# Patient Record
Sex: FEMALE | Race: WHITE
[De-identification: names, ages, dates, MRNs, and addresses within clinical notes are randomized per-mention and may not be internally consistent; named-entity substitution may affect disease eponyms.]

---

## 2017-05-10 ENCOUNTER — HOSPITAL ENCOUNTER (OUTPATIENT)
Dept: HOSPITAL 80 - FIMAGING | Age: 47
End: 2017-05-10
Attending: GENERAL ACUTE CARE HOSPITAL
Payer: COMMERCIAL

## 2017-05-10 DIAGNOSIS — Z31.9: Primary | ICD-10-CM

## 2017-05-19 ENCOUNTER — HOSPITAL ENCOUNTER (OUTPATIENT)
Dept: HOSPITAL 80 - FIMAGING | Age: 47
End: 2017-05-19
Attending: INTERNAL MEDICINE
Payer: COMMERCIAL

## 2017-05-19 DIAGNOSIS — R92.8: Primary | ICD-10-CM

## 2017-09-18 ENCOUNTER — HOSPITAL ENCOUNTER (EMERGENCY)
Dept: HOSPITAL 80 - CED | Age: 47
Discharge: HOME | End: 2017-09-18
Payer: COMMERCIAL

## 2017-09-18 VITALS — HEART RATE: 90 BPM | DIASTOLIC BLOOD PRESSURE: 74 MMHG | SYSTOLIC BLOOD PRESSURE: 117 MMHG

## 2017-09-18 VITALS — OXYGEN SATURATION: 97 % | RESPIRATION RATE: 16 BRPM | TEMPERATURE: 98.1 F

## 2017-09-18 DIAGNOSIS — T78.40XA: Primary | ICD-10-CM

## 2017-09-18 NOTE — EDPHY
H & P


Time Seen by Provider: 09/18/17 14:31





- Medical/Surgical History


Other PMH: Appendectomy.  R breast biopsy.  [facility].





- Social History


Smoking Status: Never smoked


Allergies/Adverse Reactions: 


 





No Known Allergies Allergy (Verified 09/18/17 14:30)


 








Home Medications: 














 Medication  Instructions  Recorded


 


Benadryl  09/18/17


 


EPINEPHRINE [EPIPEN] 0.3 mg IM ONCE #2 syr 09/18/17


 


Methylprednisone  09/18/17


 


Pepcid AC  09/18/17














Medical Decision Making


ED Course/Re-evaluation: 





CHIEF COMPLAINT:  Allergic reaction





HISTORY OF PRESENT ILLNESS:  47-year-old female who was taking several 

medicines for infertility. She had estradiol patches on and started developing 

hives around the estradiol patches 2 days ago.  She presented to Urgent Care 

and removed the patches.  They started her on Benadryl, Medrol Dosepak, Pepcid 

and she was doing quite well.  About an hour ago she got out of the shower and 

felt some scratchiness in her throat.  She thought she may be having recurrence 

of some sort of allergic reaction.  She came here.  She denies breathing 

difficulty, lip swelling, throat swelling, wheezing, syncope, lightheadedness, 

dizziness, nausea, vomiting.  She was just concerned because of the tingling is 

on the skin of her throat.  She has also not had complete resolution of her 

hives although they have been going away to a great extent.





REVIEW OF SYSTEMS:  





A 10 point review of systems was performed and is negative with the exception 

of the elements mentioned in the history of present illness.





PHYSICAL EXAM:  





HR, BP, O2 Sat, RR.  Temp noted


General Appearance:  Alert, well hydrated, appropriate, and non-toxic 

appearing.  Somewhat anxious


Head:  Atraumatic without scalp tenderness or obvious injury


Eyes:  Pupils equal, round, reactive to light and accommodation, EOMI, no trauma

, no injection.


Ears:  Clear bilaterally, no perforation, normal landmarks


Nose:  Atraumatic, no rhinorrhea, clear.


Throat:  There is no erythema or exudates, no lesions, normal tonsils, mucus 

membranes moist.  No evidence of lip swelling, tongue swelling uvula midline


Neck:  Supple, 2+ carotid upstroke, nontender, no lymphadenopathy.


Respiratory:  No retractions, no distress, no wheezes, and no accessory muscle 

use.  Lungs are clear to auscultation bilaterally.


Cardiovascular:  Regular rate and rhythm, no murmurs, rubs, or gallops. 

Bilateral carotid, radial, dorsalis pedis, and posterior tibial pulses intact. 

Good capillary refill all extremities.


Gastrointestinal:  Abdomen is soft, nontender, non-distended, no masses, no 

rebound, no guarding, no peritoneal signs.


Musculoskeletal:  Normal active ROM of all extremities, atraumatic.


Neurological:  Alert, appropriate, and interactive.  The patient has normal 

DTRs and non-focal cranial nerves, motor, sensory, and cerebellar exam.


Skin:  This patient has a few urticarial lesions throughout her body.  No 

confluence of hives.  No rashes, good turgor, no nodules on palpation.





Past medical history:  Noncontributory besides other allergies like these


Past surgical history:  Noncontributory


Family history:  Noncontributory


Social history:  , employed, does not abuse tobacco drugs or alcohol, 

trying to get pregnant








DIFFERENTIAL DIAGNOSIS:   The differential diagnosis included but was not 

limited to angioedema, anaphylaxis, anaphylactoid reaction, urticarial reaction

, and other infectious causes for skin rash.





MEDICAL DECISION MAKING:  This patient is having a little tingling down the 

skin of her throat.  There is no evidence whatsoever of airway compromise or 

angioedema or wheezing her breathing difficulties.  This patient has been on 

steroids since yesterday.  She did not take her Pepcid this morning.  I have 

asked her to take 40 mg of Pepcid daily and to continue that treatment.  She 

can use the Benadryl as needed.  She will also continue on Medrol Dosepak.  

Since he has no real evidence of any progressive allergic reaction here I have 

not treated her with anything.  I think the safest thing is for this patient 

have an epinephrine pen which she can use if she has any further problems that 

are significant enough to use it.  I have described when she should use it and 

that she should call 911 immediately if he does use it.





The patient and her  are comfortable with this plan.  She understands 

that this tingling in her throat may just be related to some skin lesions like 

additional urticaria or may not be related to the allergic reaction.  If it is 

related to the allergic reaction or certainly no evidence of airway compromise 

and the patient will be safe with epi pens.  She is following up with her 

infertility doctor and about an hour. 





Departure





- Departure


Disposition: Home, Routine, Self-Care


Clinical Impression: 


Allergic reaction


Qualifiers:


 Encounter type: sequela Qualified Code(s): T78.40XS - Allergy, unspecified, 

sequela





Condition: Good


Instructions:  Urticaria (ED), General Allergic Reaction (ED)


Additional Instructions: 


Continue your Medrol Dosepak


Take 40 mg of Pepcid daily you can split the dose and take 2 20 mg tablets 1 in 

the morning 1 at night


Use the epinephrine pen if you have any swelling of your lips or tongue or deep 

breathing difficulties.  Call 911 immediately if you need to use the pen.


Follow up with your infertility doctor.


Prescriptions: 


EPINEPHRINE [EPIPEN] 0.3 mg IM ONCE #2 syr

## 2018-01-12 ENCOUNTER — HOSPITAL ENCOUNTER (OUTPATIENT)
Dept: HOSPITAL 80 - FIMAGING | Age: 48
End: 2018-01-12
Attending: INTERNAL MEDICINE
Payer: COMMERCIAL

## 2018-01-12 DIAGNOSIS — N60.01: Primary | ICD-10-CM

## 2019-02-15 ENCOUNTER — HOSPITAL ENCOUNTER (OUTPATIENT)
Dept: HOSPITAL 80 - FIMAGING | Age: 49
End: 2019-02-15
Attending: INTERNAL MEDICINE
Payer: COMMERCIAL

## 2019-02-15 DIAGNOSIS — N63.12: Primary | ICD-10-CM
